# Patient Record
Sex: MALE | Race: WHITE | URBAN - METROPOLITAN AREA
[De-identification: names, ages, dates, MRNs, and addresses within clinical notes are randomized per-mention and may not be internally consistent; named-entity substitution may affect disease eponyms.]

---

## 2019-12-24 ENCOUNTER — HOSPITAL ENCOUNTER (INPATIENT)
Facility: CLINIC | Age: 44
LOS: 7 days | Discharge: HOME | DRG: 885 | End: 2019-12-31
Attending: EMERGENCY MEDICINE | Admitting: PSYCHIATRY & NEUROLOGY
Payer: COMMERCIAL

## 2019-12-24 DIAGNOSIS — F30.9 MANIA (H): Primary | ICD-10-CM

## 2019-12-24 DIAGNOSIS — F29 PSYCHOSIS, UNSPECIFIED PSYCHOSIS TYPE (H): ICD-10-CM

## 2019-12-24 DIAGNOSIS — F31.9 BIPOLAR AFFECTIVE DISORDER, REMISSION STATUS UNSPECIFIED (H): ICD-10-CM

## 2019-12-24 DIAGNOSIS — R45.1 AGITATION: ICD-10-CM

## 2019-12-24 LAB
AMPHETAMINES UR QL SCN: NEGATIVE
BARBITURATES UR QL: NEGATIVE
BENZODIAZ UR QL: NEGATIVE
CANNABINOIDS UR QL SCN: POSITIVE
COCAINE UR QL: NEGATIVE
ETHANOL SERPL-MCNC: <0.01 G/DL
OPIATES UR QL SCN: NEGATIVE
PCP UR QL SCN: NEGATIVE

## 2019-12-24 PROCEDURE — 25000132 ZZH RX MED GY IP 250 OP 250 PS 637: Performed by: PSYCHIATRY & NEUROLOGY

## 2019-12-24 PROCEDURE — 12400000 ZZH R&B MH

## 2019-12-24 PROCEDURE — 80307 DRUG TEST PRSMV CHEM ANLYZR: CPT | Performed by: EMERGENCY MEDICINE

## 2019-12-24 PROCEDURE — 96372 THER/PROPH/DIAG INJ SC/IM: CPT

## 2019-12-24 PROCEDURE — 99285 EMERGENCY DEPT VISIT HI MDM: CPT | Mod: 25

## 2019-12-24 PROCEDURE — 25000132 ZZH RX MED GY IP 250 OP 250 PS 637: Performed by: EMERGENCY MEDICINE

## 2019-12-24 PROCEDURE — 25000128 H RX IP 250 OP 636

## 2019-12-24 PROCEDURE — 80320 DRUG SCREEN QUANTALCOHOLS: CPT | Performed by: EMERGENCY MEDICINE

## 2019-12-24 PROCEDURE — 25000125 ZZHC RX 250

## 2019-12-24 PROCEDURE — 90791 PSYCH DIAGNOSTIC EVALUATION: CPT

## 2019-12-24 RX ORDER — LORAZEPAM 2 MG/ML
INJECTION INTRAMUSCULAR
Status: COMPLETED
Start: 2019-12-24 | End: 2019-12-24

## 2019-12-24 RX ORDER — DIPHENHYDRAMINE HYDROCHLORIDE 50 MG/ML
50 INJECTION INTRAMUSCULAR; INTRAVENOUS EVERY 6 HOURS PRN
Status: DISCONTINUED | OUTPATIENT
Start: 2019-12-24 | End: 2019-12-31 | Stop reason: HOSPADM

## 2019-12-24 RX ORDER — HALOPERIDOL 5 MG/1
5-10 TABLET ORAL EVERY 6 HOURS PRN
Status: DISCONTINUED | OUTPATIENT
Start: 2019-12-24 | End: 2019-12-25

## 2019-12-24 RX ORDER — HALOPERIDOL 5 MG/ML
5-10 INJECTION INTRAMUSCULAR EVERY 6 HOURS PRN
Status: DISCONTINUED | OUTPATIENT
Start: 2019-12-24 | End: 2019-12-25

## 2019-12-24 RX ORDER — OLANZAPINE 10 MG/2ML
5-10 INJECTION, POWDER, FOR SOLUTION INTRAMUSCULAR EVERY 6 HOURS PRN
Status: DISCONTINUED | OUTPATIENT
Start: 2019-12-24 | End: 2019-12-25

## 2019-12-24 RX ORDER — OLANZAPINE 10 MG/2ML
INJECTION, POWDER, FOR SOLUTION INTRAMUSCULAR
Status: COMPLETED
Start: 2019-12-24 | End: 2019-12-24

## 2019-12-24 RX ORDER — TRAZODONE HYDROCHLORIDE 50 MG/1
50-100 TABLET, FILM COATED ORAL AT BEDTIME
Status: ON HOLD | COMMUNITY
End: 2019-12-31

## 2019-12-24 RX ORDER — LORAZEPAM 1 MG/1
1-2 TABLET ORAL EVERY 6 HOURS PRN
Status: DISCONTINUED | OUTPATIENT
Start: 2019-12-24 | End: 2019-12-25

## 2019-12-24 RX ORDER — OLANZAPINE 5 MG/1
5 TABLET ORAL ONCE
Status: COMPLETED | OUTPATIENT
Start: 2019-12-24 | End: 2019-12-24

## 2019-12-24 RX ORDER — OLANZAPINE 2.5 MG/1
2.5 TABLET, FILM COATED ORAL ONCE
Status: DISCONTINUED | OUTPATIENT
Start: 2019-12-24 | End: 2019-12-24

## 2019-12-24 RX ORDER — LORAZEPAM 2 MG/ML
1-2 INJECTION INTRAMUSCULAR EVERY 6 HOURS PRN
Status: DISCONTINUED | OUTPATIENT
Start: 2019-12-24 | End: 2019-12-25

## 2019-12-24 RX ORDER — ACETAMINOPHEN 325 MG/1
650 TABLET ORAL EVERY 4 HOURS PRN
Status: DISCONTINUED | OUTPATIENT
Start: 2019-12-24 | End: 2019-12-31 | Stop reason: HOSPADM

## 2019-12-24 RX ORDER — LORAZEPAM 2 MG/ML
2 INJECTION INTRAMUSCULAR ONCE
Status: COMPLETED | OUTPATIENT
Start: 2019-12-24 | End: 2019-12-24

## 2019-12-24 RX ORDER — ARIPIPRAZOLE 2 MG/1
4 TABLET ORAL DAILY
Status: ON HOLD | COMMUNITY
End: 2019-12-31

## 2019-12-24 RX ORDER — OLANZAPINE 10 MG/2ML
10 INJECTION, POWDER, FOR SOLUTION INTRAMUSCULAR DAILY PRN
Status: DISCONTINUED | OUTPATIENT
Start: 2019-12-24 | End: 2019-12-24 | Stop reason: DRUGHIGH

## 2019-12-24 RX ORDER — OLANZAPINE 2.5 MG/1
2.5 TABLET, FILM COATED ORAL AT BEDTIME
Status: DISCONTINUED | OUTPATIENT
Start: 2019-12-24 | End: 2019-12-24

## 2019-12-24 RX ORDER — HYDROXYZINE HYDROCHLORIDE 25 MG/1
25 TABLET, FILM COATED ORAL EVERY 4 HOURS PRN
Status: DISCONTINUED | OUTPATIENT
Start: 2019-12-24 | End: 2019-12-31 | Stop reason: HOSPADM

## 2019-12-24 RX ORDER — CLONAZEPAM 0.5 MG/1
0.5 TABLET ORAL 2 TIMES DAILY PRN
Status: DISCONTINUED | OUTPATIENT
Start: 2019-12-24 | End: 2019-12-31 | Stop reason: HOSPADM

## 2019-12-24 RX ORDER — ARIPIPRAZOLE 2 MG/1
2 TABLET ORAL DAILY
Status: DISCONTINUED | OUTPATIENT
Start: 2019-12-24 | End: 2019-12-24 | Stop reason: CLARIF

## 2019-12-24 RX ORDER — WATER 10 ML/10ML
INJECTION INTRAMUSCULAR; INTRAVENOUS; SUBCUTANEOUS
Status: COMPLETED
Start: 2019-12-24 | End: 2019-12-24

## 2019-12-24 RX ORDER — DIVALPROEX SODIUM 500 MG/1
500 TABLET, EXTENDED RELEASE ORAL AT BEDTIME
Status: DISCONTINUED | OUTPATIENT
Start: 2019-12-24 | End: 2019-12-25

## 2019-12-24 RX ORDER — CLONAZEPAM 0.5 MG/1
0.5 TABLET ORAL 2 TIMES DAILY PRN
Status: ON HOLD | COMMUNITY
End: 2019-12-31

## 2019-12-24 RX ORDER — OLANZAPINE 5 MG/1
5 TABLET ORAL AT BEDTIME
Status: DISCONTINUED | OUTPATIENT
Start: 2019-12-24 | End: 2019-12-24

## 2019-12-24 RX ORDER — OLANZAPINE 5 MG/1
5-10 TABLET, ORALLY DISINTEGRATING ORAL EVERY 6 HOURS PRN
Status: DISCONTINUED | OUTPATIENT
Start: 2019-12-24 | End: 2019-12-25

## 2019-12-24 RX ORDER — ARIPIPRAZOLE 10 MG/1
10 TABLET ORAL DAILY
Status: DISCONTINUED | OUTPATIENT
Start: 2019-12-24 | End: 2019-12-31 | Stop reason: HOSPADM

## 2019-12-24 RX ADMIN — LORAZEPAM 2 MG: 2 INJECTION INTRAMUSCULAR; INTRAVENOUS at 00:50

## 2019-12-24 RX ADMIN — LORAZEPAM 2 MG: 1 TABLET ORAL at 13:45

## 2019-12-24 RX ADMIN — OLANZAPINE 10 MG: 10 INJECTION, POWDER, FOR SOLUTION INTRAMUSCULAR at 00:51

## 2019-12-24 RX ADMIN — OLANZAPINE 5 MG: 5 TABLET, FILM COATED ORAL at 12:53

## 2019-12-24 RX ADMIN — DIVALPROEX SODIUM 500 MG: 500 TABLET, EXTENDED RELEASE ORAL at 21:01

## 2019-12-24 RX ADMIN — LORAZEPAM 2 MG: 2 INJECTION INTRAMUSCULAR at 00:50

## 2019-12-24 RX ADMIN — WATER: 1 INJECTION INTRAMUSCULAR; INTRAVENOUS; SUBCUTANEOUS at 00:50

## 2019-12-24 RX ADMIN — HALOPERIDOL 10 MG: 5 TABLET ORAL at 13:45

## 2019-12-24 ASSESSMENT — ENCOUNTER SYMPTOMS: AGITATION: 1

## 2019-12-24 ASSESSMENT — ACTIVITIES OF DAILY LIVING (ADL)
DRESS: INDEPENDENT
DRESS: 0-->INDEPENDENT
HYGIENE/GROOMING: INDEPENDENT
LAUNDRY: WITH SUPERVISION
COGNITION: 0 - NO COGNITION ISSUES REPORTED
AMBULATION: 0-->INDEPENDENT
ORAL_HYGIENE: INDEPENDENT
LAUNDRY: WITH SUPERVISION
ORAL_HYGIENE: INDEPENDENT
HYGIENE/GROOMING: INDEPENDENT
RETIRED_COMMUNICATION: 0-->UNDERSTANDS/COMMUNICATES WITHOUT DIFFICULTY
DRESS: INDEPENDENT
RETIRED_EATING: 0-->INDEPENDENT
FALL_HISTORY_WITHIN_LAST_SIX_MONTHS: NO
TRANSFERRING: 0-->INDEPENDENT
SWALLOWING: 0-->SWALLOWS FOODS/LIQUIDS WITHOUT DIFFICULTY
TOILETING: 0-->INDEPENDENT
BATHING: 0-->INDEPENDENT

## 2019-12-24 ASSESSMENT — MIFFLIN-ST. JEOR: SCORE: 1770.78
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
## 2019-12-24 NOTE — PROGRESS NOTES
12/24/19 1342   Patient Belongings   Did you bring any home meds/supplements to the hospital?  No   Patient Belongings locker   Patient Belongings Put in Hospital Secure Location (Security or Locker, etc.) clothing;other (see comments)   Belongings Search Yes   Clothing Search Yes   Second Staff Caridad    Comment Pt belongings were searched and placed in lcoker    black tote  Black jacket   Hand lotion   Battery  Boxers  Sweat pant with strings      A               Admission:  I am responsible for any personal items that are not sent to the safe or pharmacy.  [facility] is not responsible for loss, theft or damage of any property in my possession.    Signature:  _________________________________ Date: _______  Time: _____                                              Staff Signature:  ____________________________ Date: ________  Time: _____      2nd Staff person, if patient is unable/unwilling to sign:    Signature: ________________________________ Date: ________  Time: _____     Discharge:  [facility] has returned all of my personal belongings:    Signature: _________________________________ Date: ________  Time: _____                                          Staff Signature:  ____________________________ Date: ________  Time: _____

## 2019-12-24 NOTE — ED NOTES
"Pt. approached the desk, told this writer to \"contact Trump and make him dump\" and made other comments that did not make sense to this writer. Pt. redirected to sit in common area. Spouse at bedside.  "

## 2019-12-24 NOTE — ED PROVIDER NOTES
Assumed care at change of shift.  Patient awaiting admission.  This was arranged here at [facility].  Initiated a 72-hour hold given the patient's psychosis and lack of insight.     Scar Bennett MD  12/24/19 5618

## 2019-12-24 NOTE — ED NOTES
"Pt. pacing around the commons area, climbing on the ledge of the window to look out the window. Pt. then attempted to open door to another patient's room. Pt. came up to desk, then asked \"can I help you?\" to this writer and asked \"can I give you the password?\". This writer notified patient that a hot lunch was ordered, and that this writer would bring it to the room when ready. Pt. appears restless.   "

## 2019-12-24 NOTE — ED NOTES
Patient's spouse, Valerie, arrived.  Pt was happy to see spouse.  Given lunch.  Pt still speaking nonsensical.

## 2019-12-24 NOTE — ED NOTES
"Patient removed pants.  Instructed to put back on, states \"I want my own pants.\"  Patient went over to lockers to attempt to open.  Stated \"I'm working some shit out.\"  Patient then went back over to pants to put back on.  Redirectable, cooperative.   "

## 2019-12-24 NOTE — ED NOTES
Given copy of 72 hour hold.  Pt was upset but not aggressive.  Patient asked offered zyprexa, declined.

## 2019-12-24 NOTE — PHARMACY-ADMISSION MEDICATION HISTORY
Pharmacy Medication History  Admission medication history interview status for the 12/24/2019  admission is complete. See EPIC admission navigator for prior to admission medications     Medication history sources: Spoke w/ patient.  Called patient's wife (Valerie [phone]) to verify medications per patient request.    Medication history source reliability: Moderate  Adherence assessment: Moderate    Significant changes made to the medication list:  - All medications added.     Medication reconciliation completed by provider prior to medication history? No    Time spent in this activity: 10 minutes      Prior to Admission medications    Medication Sig Last Dose Taking? Auth Provider   ARIPiprazole (ABILIFY) 2 MG tablet Take 4 mg by mouth daily Past Week at Unknown time Yes Unknown, Entered By History   clonazePAM (KLONOPIN) 0.5 MG tablet Take 0.5 mg by mouth 2 times daily as needed for anxiety  at prn Yes Unknown, Entered By History   traZODone (DESYREL) 50 MG tablet Take  mg by mouth At Bedtime Past Week at Unknown time Yes Unknown, Entered By History     Leigh Gallegos, PharmD, BCPS
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
## 2019-12-24 NOTE — ED NOTES
"Nurse spoke to patients wife Yary. Per patients wife patient has been dealing with medication changes for his bipolar disorder for the last few months and he has not been sleeping much. Per patients wife over the summer patient was drinking quite a bit and \"self medicating\". Patient has been doing Intensive outpatient for his alcohol issues four days a week at [facility]. For the last few days patient has been having worsening manic symptoms which were noticed at [facility] on Thursday prompting them to bring him to a crisis center near the facility for evaluation. Crisis center cleared him for discharge due to him having a scheduled appointment with his physician the following day. Patient missed his appointment on Friday and but was able to get his prescriptions filled. Patient got his prescription and took his dose however he continued to be manic. Patient was found wondering at the airport and was taken home due to having it be the holidays and wife being comfortable with his return. Patient began getting angry at family tonight wanted to leave and wife called PD to get patient help.   "

## 2019-12-24 NOTE — ED NOTES
Patient taken out of restraints. Patient resting in bed with eyes closed. Vital signs stable. Condom catheter placed in attempts to obtain urine sample. Will continue to monitor

## 2019-12-25 LAB
ANION GAP SERPL CALCULATED.3IONS-SCNC: 4 MMOL/L (ref 3–14)
BASOPHILS # BLD AUTO: 0 10E9/L (ref 0–0.2)
BASOPHILS NFR BLD AUTO: 0.3 %
BUN SERPL-MCNC: 11 MG/DL (ref 7–30)
CALCIUM SERPL-MCNC: 9.4 MG/DL (ref 8.5–10.1)
CHLORIDE SERPL-SCNC: 106 MMOL/L (ref 94–109)
CO2 SERPL-SCNC: 29 MMOL/L (ref 20–32)
CREAT SERPL-MCNC: 0.87 MG/DL (ref 0.66–1.25)
DIFFERENTIAL METHOD BLD: NORMAL
EOSINOPHIL # BLD AUTO: 0.3 10E9/L (ref 0–0.7)
EOSINOPHIL NFR BLD AUTO: 2.7 %
ERYTHROCYTE [DISTWIDTH] IN BLOOD BY AUTOMATED COUNT: 12.3 % (ref 10–15)
GFR SERPL CREATININE-BSD FRML MDRD: >90 ML/MIN/{1.73_M2}
GLUCOSE SERPL-MCNC: 100 MG/DL (ref 70–99)
HCT VFR BLD AUTO: 46.8 % (ref 40–53)
HGB BLD-MCNC: 16.3 G/DL (ref 13.3–17.7)
IMM GRANULOCYTES # BLD: 0 10E9/L (ref 0–0.4)
IMM GRANULOCYTES NFR BLD: 0.1 %
LYMPHOCYTES # BLD AUTO: 2.2 10E9/L (ref 0.8–5.3)
LYMPHOCYTES NFR BLD AUTO: 23.6 %
MCH RBC QN AUTO: 31.7 PG (ref 26.5–33)
MCHC RBC AUTO-ENTMCNC: 34.8 G/DL (ref 31.5–36.5)
MCV RBC AUTO: 91 FL (ref 78–100)
MONOCYTES # BLD AUTO: 0.5 10E9/L (ref 0–1.3)
MONOCYTES NFR BLD AUTO: 5.4 %
NEUTROPHILS # BLD AUTO: 6.4 10E9/L (ref 1.6–8.3)
NEUTROPHILS NFR BLD AUTO: 67.9 %
NRBC # BLD AUTO: 0 10*3/UL
NRBC BLD AUTO-RTO: 0 /100
PLATELET # BLD AUTO: 322 10E9/L (ref 150–450)
POTASSIUM SERPL-SCNC: 4.2 MMOL/L (ref 3.4–5.3)
RBC # BLD AUTO: 5.15 10E12/L (ref 4.4–5.9)
SODIUM SERPL-SCNC: 139 MMOL/L (ref 133–144)
TSH SERPL DL<=0.005 MIU/L-ACNC: 0.89 MU/L (ref 0.4–4)
WBC # BLD AUTO: 9.4 10E9/L (ref 4–11)

## 2019-12-25 PROCEDURE — 84443 ASSAY THYROID STIM HORMONE: CPT | Performed by: PSYCHIATRY & NEUROLOGY

## 2019-12-25 PROCEDURE — 85025 COMPLETE CBC W/AUTO DIFF WBC: CPT | Performed by: PSYCHIATRY & NEUROLOGY

## 2019-12-25 PROCEDURE — 99207 ZZC NON-BILLABLE SERV PER CHARTING: CPT | Performed by: PHYSICIAN ASSISTANT

## 2019-12-25 PROCEDURE — 25000132 ZZH RX MED GY IP 250 OP 250 PS 637: Performed by: PSYCHIATRY & NEUROLOGY

## 2019-12-25 PROCEDURE — 12400000 ZZH R&B MH

## 2019-12-25 PROCEDURE — 36415 COLL VENOUS BLD VENIPUNCTURE: CPT | Performed by: PSYCHIATRY & NEUROLOGY

## 2019-12-25 PROCEDURE — 80048 BASIC METABOLIC PNL TOTAL CA: CPT | Performed by: PSYCHIATRY & NEUROLOGY

## 2019-12-25 RX ORDER — DIVALPROEX SODIUM 500 MG/1
1000 TABLET, EXTENDED RELEASE ORAL AT BEDTIME
Status: DISCONTINUED | OUTPATIENT
Start: 2019-12-25 | End: 2019-12-26

## 2019-12-25 RX ORDER — HALOPERIDOL 5 MG/1
5-10 TABLET ORAL EVERY 4 HOURS PRN
Status: DISCONTINUED | OUTPATIENT
Start: 2019-12-25 | End: 2019-12-31 | Stop reason: HOSPADM

## 2019-12-25 RX ORDER — OLANZAPINE 10 MG/2ML
5-10 INJECTION, POWDER, FOR SOLUTION INTRAMUSCULAR EVERY 4 HOURS PRN
Status: DISCONTINUED | OUTPATIENT
Start: 2019-12-25 | End: 2019-12-31 | Stop reason: HOSPADM

## 2019-12-25 RX ORDER — DIVALPROEX SODIUM 500 MG/1
500 TABLET, EXTENDED RELEASE ORAL DAILY
Status: DISCONTINUED | OUTPATIENT
Start: 2019-12-26 | End: 2019-12-26

## 2019-12-25 RX ORDER — DIVALPROEX SODIUM 500 MG/1
500 TABLET, EXTENDED RELEASE ORAL 2 TIMES DAILY
Status: DISCONTINUED | OUTPATIENT
Start: 2019-12-25 | End: 2019-12-25

## 2019-12-25 RX ORDER — DIVALPROEX SODIUM 500 MG/1
1000 TABLET, DELAYED RELEASE ORAL AT BEDTIME
Status: DISCONTINUED | OUTPATIENT
Start: 2019-12-25 | End: 2019-12-25

## 2019-12-25 RX ORDER — OLANZAPINE 5 MG/1
5-10 TABLET, ORALLY DISINTEGRATING ORAL EVERY 4 HOURS PRN
Status: DISCONTINUED | OUTPATIENT
Start: 2019-12-25 | End: 2019-12-31 | Stop reason: HOSPADM

## 2019-12-25 RX ADMIN — NICOTINE POLACRILEX 4 MG: 2 GUM, CHEWING ORAL at 13:29

## 2019-12-25 RX ADMIN — HALOPERIDOL 10 MG: 5 TABLET ORAL at 07:57

## 2019-12-25 RX ADMIN — HALOPERIDOL 5 MG: 5 TABLET ORAL at 20:34

## 2019-12-25 RX ADMIN — HYDROXYZINE HYDROCHLORIDE 25 MG: 25 TABLET, FILM COATED ORAL at 20:07

## 2019-12-25 RX ADMIN — DIVALPROEX SODIUM 500 MG: 500 TABLET, EXTENDED RELEASE ORAL at 10:38

## 2019-12-25 RX ADMIN — NICOTINE POLACRILEX 4 MG: 2 GUM, CHEWING ORAL at 17:56

## 2019-12-25 RX ADMIN — HYDROXYZINE HYDROCHLORIDE 25 MG: 25 TABLET, FILM COATED ORAL at 13:32

## 2019-12-25 RX ADMIN — OLANZAPINE 10 MG: 5 TABLET, ORALLY DISINTEGRATING ORAL at 10:39

## 2019-12-25 RX ADMIN — ARIPIPRAZOLE 10 MG: 10 TABLET ORAL at 07:57

## 2019-12-25 RX ADMIN — OLANZAPINE 10 MG: 5 TABLET, ORALLY DISINTEGRATING ORAL at 17:00

## 2019-12-25 RX ADMIN — DIVALPROEX SODIUM 1000 MG: 500 TABLET, EXTENDED RELEASE ORAL at 20:35

## 2019-12-25 RX ADMIN — OLANZAPINE 10 MG: 5 TABLET, ORALLY DISINTEGRATING ORAL at 04:13

## 2019-12-25 RX ADMIN — NICOTINE POLACRILEX 4 MG: 2 GUM, CHEWING ORAL at 19:47

## 2019-12-25 RX ADMIN — OLANZAPINE 10 MG: 5 TABLET, ORALLY DISINTEGRATING ORAL at 13:49

## 2019-12-25 RX ADMIN — NICOTINE POLACRILEX 4 MG: 2 GUM, CHEWING ORAL at 16:33

## 2019-12-25 ASSESSMENT — ACTIVITIES OF DAILY LIVING (ADL)
DRESS: INDEPENDENT
HYGIENE/GROOMING: INDEPENDENT
HYGIENE/GROOMING: INDEPENDENT
ORAL_HYGIENE: INDEPENDENT
ORAL_HYGIENE: INDEPENDENT
DRESS: INDEPENDENT

## 2019-12-25 NOTE — PROGRESS NOTES
Pt became less manic further into the evening. His wife and sister visited him. He was mostly sedated. RN attempted to complete admission profile, but patient was too sedated and tired to participated. Nursing will continue to monitor for safety.

## 2019-12-25 NOTE — PLAN OF CARE
"Pt initially remained manic, pacing in room and lounge. Speech pressured and rambling at times with flight of ideas; disorganized. Disoriented to time and situation. Became lethargic around 1700, stating \"I just feel so tired\". Pt slept through dinner, tray saved. Thought process impaired, with poor judgement and insight. Compliant with medications. Denied VAH/SI.     "

## 2019-12-25 NOTE — PROGRESS NOTES
Pt has been restless and anxious today; presents psychotic features. Presents a tense and sometimes labile affect but has remained calm throughout the shift. Respectful to peers and staff. Took a shower around 1130; well groomed. Pt seems paranoid at some points and his speech is circumstantial and consists of rambling and/or flight of ideas; at one point pt stated that he didn't care if people saw him getting carried away by aliens. Meds have seemed to decrease these symptoms of his speech. Pt talks about his wife and if /when she can come to get him out of here and how embarrassing it is if people found out.
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
## 2019-12-25 NOTE — H&P
Pt seen for initial psychiatric evaluation, please see my dictation for details and recommendations. Dr. Garduno

## 2019-12-25 NOTE — PLAN OF CARE
BEHAVIORAL TEAM DISCUSSION    Participants: dr flores, RN, CTC (v jacklyn)  Progress: new admit.   Admitted with zulay, agitation, and psychosis.   Pt is more calm today after some doses of medication.  Anticipated length of stay: a week  Continued Stay Criteria/Rationale: new admit  Medical/Physical: no  known acute medical issues   Precautions:   Behavioral Orders   Procedures    Assault precautions    Code 1 - Restrict to Unit    Routine Programming     As clinically indicated    Status 15     Every 15 minutes.     Plan:  -Meds per Dr Flores  -Assist with outpt psychiatry appt.  -Determine whether pt will resume tx at [facility]'s outpt CD tx program.   As of today, pt is saying he does NOT want to return to the program.  -He will return to live at home.    Rationale for change in precautions or plan: no change at this time

## 2019-12-26 LAB — VALPROATE SERPL-MCNC: 54 MG/L (ref 50–100)

## 2019-12-26 PROCEDURE — 36415 COLL VENOUS BLD VENIPUNCTURE: CPT | Performed by: PSYCHIATRY & NEUROLOGY

## 2019-12-26 PROCEDURE — 25000132 ZZH RX MED GY IP 250 OP 250 PS 637: Performed by: PSYCHIATRY & NEUROLOGY

## 2019-12-26 PROCEDURE — 12400000 ZZH R&B MH

## 2019-12-26 PROCEDURE — 80164 ASSAY DIPROPYLACETIC ACD TOT: CPT | Performed by: PSYCHIATRY & NEUROLOGY

## 2019-12-26 RX ORDER — DIVALPROEX SODIUM 500 MG/1
1000 TABLET, EXTENDED RELEASE ORAL 2 TIMES DAILY
Status: DISCONTINUED | OUTPATIENT
Start: 2019-12-26 | End: 2019-12-31 | Stop reason: HOSPADM

## 2019-12-26 RX ORDER — DIVALPROEX SODIUM 500 MG/1
500 TABLET, EXTENDED RELEASE ORAL ONCE
Status: COMPLETED | OUTPATIENT
Start: 2019-12-26 | End: 2019-12-26

## 2019-12-26 RX ADMIN — HYDROXYZINE HYDROCHLORIDE 25 MG: 25 TABLET, FILM COATED ORAL at 23:04

## 2019-12-26 RX ADMIN — HALOPERIDOL 10 MG: 5 TABLET ORAL at 16:28

## 2019-12-26 RX ADMIN — OLANZAPINE 10 MG: 5 TABLET, ORALLY DISINTEGRATING ORAL at 15:42

## 2019-12-26 RX ADMIN — DIVALPROEX SODIUM 1000 MG: 500 TABLET, EXTENDED RELEASE ORAL at 23:03

## 2019-12-26 RX ADMIN — NICOTINE POLACRILEX 4 MG: 2 GUM, CHEWING ORAL at 07:06

## 2019-12-26 RX ADMIN — HYDROXYZINE HYDROCHLORIDE 25 MG: 25 TABLET, FILM COATED ORAL at 18:35

## 2019-12-26 RX ADMIN — HALOPERIDOL 10 MG: 5 TABLET ORAL at 06:39

## 2019-12-26 RX ADMIN — NICOTINE POLACRILEX 4 MG: 2 GUM, CHEWING ORAL at 18:43

## 2019-12-26 RX ADMIN — DIVALPROEX SODIUM 500 MG: 500 TABLET, EXTENDED RELEASE ORAL at 13:40

## 2019-12-26 RX ADMIN — DIVALPROEX SODIUM 500 MG: 500 TABLET, EXTENDED RELEASE ORAL at 07:52

## 2019-12-26 RX ADMIN — NICOTINE POLACRILEX 4 MG: 2 GUM, CHEWING ORAL at 10:27

## 2019-12-26 RX ADMIN — HALOPERIDOL 10 MG: 5 TABLET ORAL at 23:04

## 2019-12-26 RX ADMIN — ARIPIPRAZOLE 10 MG: 10 TABLET ORAL at 07:52

## 2019-12-26 RX ADMIN — CLONAZEPAM 0.5 MG: 0.5 TABLET ORAL at 18:36

## 2019-12-26 ASSESSMENT — ACTIVITIES OF DAILY LIVING (ADL)
ORAL_HYGIENE: INDEPENDENT
LAUNDRY: WITH SUPERVISION
DRESS: INDEPENDENT
HYGIENE/GROOMING: INDEPENDENT

## 2019-12-26 NOTE — PROGRESS NOTES
12/26/19 1300   General Information   Has Not Attended OT as of: 12/26/19     Pt has not attended OT since admit.  Will continue to encourage participation and completion of  self-assessment as able. OT staff will explain the purpose of being involved with treatment plan and provide options to meet current needs and goals.

## 2019-12-26 NOTE — PROGRESS NOTES
Shift Update: Pt mood is anxious. Thought process is impaired. Insight is poor.    Patient up at 0400 asking to use phone; redirected and remained in lounge restless. Pt requested nicotine gum to help calm himself; writer explained nicotine use times. Writer offered PRN Haldol; pt declined stating he doesn't want to get sleepy since it was already close to time to wake up. Patient was educated on anxiety and agitation symptoms and the use of PRNs to alleviate symptoms. Patient was receptive.       0630 Patient became very agitated and wanted to see the psychiatrist and call his wife. Patient was disruptive, shouted verbal obscenities, slammed the phone multiple times, and was verbally aggressive to other patients. Patient was shouting delusional statements about the clock and time, and displayed paranoid symptoms. Was instructed multiple times to go to room. PRN Haldol 10 mg and nicotine gum given.

## 2019-12-26 NOTE — PROGRESS NOTES
[facility] Psychiatric Progress Note       Interim History     The patient's care was discussed with the treatment team and chart notes were reviewed. Pt seen on 12/26/2019.  Patient received several doses of Zyprexa and 10 mg of Haldol Keppra 1500 mg of Depakote with a blood level of 54.  Staff report patient has been irritable anxious and agitated this also interacting negatively to other patients.  Patient continues to have poor insight we did agree that he has problems with zualy and he started to see a true patient also has started to have insight into his abuse of chemicals is willing to pursue competency treatment once he is completed his stabilization.  Patient was able to calm down after a long meeting with Dr. Garduno.  He had previously signed voluntarily in the hospital 72-hour hold was discontinued.  Later on staff report patient became agitated.  After argument on the phone with his wife again and wanted to sign out this placed back on a 72-hour hold by Dr. Garduno     Hospital Course        Medications     Current Facility-Administered Medications Ordered in Epic   Medication Dose Route Frequency Last Rate Last Dose     acetaminophen (TYLENOL) tablet 650 mg  650 mg Oral Q4H PRN         ARIPiprazole (ABILIFY) tablet 10 mg  10 mg Oral Daily   10 mg at 12/26/19 0752     clonazePAM (klonoPIN) tablet 0.5 mg  0.5 mg Oral BID PRN         diphenhydrAMINE (BENADRYL) injection 50 mg  50 mg Intramuscular Q6H PRN         divalproex sodium extended-release (DEPAKOTE ER) 24 hr tablet 1,000 mg  1,000 mg Oral BID         divalproex sodium extended-release (DEPAKOTE ER) 24 hr tablet 500 mg  500 mg Oral Once         haloperidol (HALDOL) tablet 5-10 mg  5-10 mg Oral Q4H PRN   10 mg at 12/26/19 0639     hydrOXYzine (ATARAX) tablet 25 mg  25 mg Oral Q4H PRN   25 mg at 12/25/19 2007     nicotine (NICORETTE) gum 2-4 mg  2-4 mg Buccal Q1H PRN   4 mg at 12/26/19 1027     OLANZapine zydis (zyPREXA) ODT  "tab 5-10 mg  5-10 mg Oral Q4H PRN   10 mg at 12/25/19 1700    Or     OLANZapine (zyPREXA) injection 5-10 mg  5-10 mg Intramuscular Q4H PRN         No current Epic-ordered outpatient medications on file.         Allergies      No Known Allergies     Medical Review of Systems     /79   Pulse 69   Temp 97.6  F (36.4  C) (Oral)   Resp 18   Ht 1.854 m (6' 1\")   Wt 83.2 kg (183 lb 6.4 oz)   SpO2 97%   BMI 24.20 kg/m    Body mass index is 24.2 kg/m .  A 10-point review of systems was performed by Segundo Garduno MD and is negative, no new findings.      Psychiatric Examination     Appearance Sitting in chair, dressed in hospital scrubs. Appears stated age.   Attitude Cooperative   Orientation Oriented to person, place, time   Eye Contact Poor   Speech Regular rate, rhythm, volume and tone   Language Normal   Psychomotor Behavior Normal   Mood  labile reaction.   Affect  aggravated irritable   Thought Process Goal-Oriented, Intact   Associations  much less but still present   Thought Content  positive for grandiosity paranoia racing thoughts in   Fund of Knowledge  intact   Insight  Insight impaired but some improvement   Judgement  judgment still extremely impaired   Attention Span & Concentration  poor   Recent & Remote Memory  intact   Gait Normal   Muscle Tone Intact        Labs     Labs reviewed.  Recent Results (from the past 24 hour(s))   Valproic acid    Collection Time: 12/26/19  8:00 AM   Result Value Ref Range    Valproic Acid Level 54 50 - 100 mg/L        Impression     This is a 44 year old male with history of bipolar affective disorder currently manic with psychotic features extremely labile he is received multiple milligrams of medicines we will increase his Depakote to 1000 twice a day Depakote XR gave verbal consent we will recheck a blood level tomorrow again after having a 54 this morning on 1500 mg we will continue Zyprexa and also as needed's of Haldol initial attempts patient signed " voluntarily and is now refusing was placed back in a 72-hour hold patient also had some better insight into need for greater level of compressive treatment care including mental illness        Diagnoses     Bipolar Disorder, most recent episode manic with psychotic features   Alcohol use disorder  Cannabis use disorder     Plan     1. Explained side effects, benefits, and complications of medications to the patient, Pt gave verbal consent.  Alcohol use disorder  2. Medication changes: Increase Depakote to 2000 and ER extended release twice a day, continue Zyprexa scheduled and as needed was also on Haldol  3. Discussed treatment plan with patient and team.  4. Projected length of stay: 10 days  5. Once patient is stable we will redo competency assessment  6. Continue to contact patient's spouse      Attestation:   Patient has been seen and evaluated by me, Segundo Garduno MD.    Patient ID:  Name: Nando Garcia    MRN: [id number]  Admission: 12/24/2019   YOB: 1975
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
## 2019-12-26 NOTE — PLAN OF CARE
Shift Update: Pt mood is tense labile Thought process is impaired. Insight is poor. Pt denies suicidal ideation. Pt agitated this AM demanding to see MD and and wanted to go home.Pt was reassured that the psychiatrist would see him. His mood switches from being angry , agitated to polite. States he wants to go home but he still exhibits manic behaviors and is intrusive and irritable toward others. Said to one peer that he should read about Cesar henderson and pointed the finger at him. Psychiatry in to see pt and increased his Depakote to 1000 mg bid. Depakote level today 53. His wife called and would like a call back from the Psychiatrist after he sees him. This message was relayed and tel # given. Extra dose of Depakote 500 mg to be given ASAP.

## 2019-12-26 NOTE — PLAN OF CARE
Problem: Adult Inpatient Plan of Care  Goal: Plan of Care Review  12/25/2019 2219 by Joaquin Flower RN  Outcome: Declining  Pt mood is is labile. Presents as angry, tense, and irritable. He was fixated on going home with his wife and sister. Became agitated and required 5 mg of PRN Haldol. Recent med changes also include an increase of his HS Depakote to 1000 mg. Thought process is disorganized. Continues to be hyperactive and restless. Insight is poor. Pt denies any suicidal ideation. Nursing will continue to for safety.

## 2019-12-27 PROCEDURE — 36415 COLL VENOUS BLD VENIPUNCTURE: CPT | Performed by: PSYCHIATRY & NEUROLOGY

## 2019-12-27 PROCEDURE — 80165 DIPROPYLACETIC ACID FREE: CPT | Performed by: PSYCHIATRY & NEUROLOGY

## 2019-12-27 PROCEDURE — 12400000 ZZH R&B MH

## 2019-12-27 PROCEDURE — 25000132 ZZH RX MED GY IP 250 OP 250 PS 637: Performed by: PSYCHIATRY & NEUROLOGY

## 2019-12-27 RX ORDER — HALOPERIDOL 5 MG/1
10 TABLET ORAL 3 TIMES DAILY
Status: DISCONTINUED | OUTPATIENT
Start: 2019-12-27 | End: 2019-12-31 | Stop reason: HOSPADM

## 2019-12-27 RX ADMIN — OLANZAPINE 10 MG: 5 TABLET, ORALLY DISINTEGRATING ORAL at 01:19

## 2019-12-27 RX ADMIN — HALOPERIDOL 10 MG: 5 TABLET ORAL at 20:45

## 2019-12-27 RX ADMIN — NICOTINE POLACRILEX 2 MG: 2 GUM, CHEWING ORAL at 18:15

## 2019-12-27 RX ADMIN — ARIPIPRAZOLE 10 MG: 10 TABLET ORAL at 08:00

## 2019-12-27 RX ADMIN — OLANZAPINE 10 MG: 5 TABLET, ORALLY DISINTEGRATING ORAL at 11:51

## 2019-12-27 RX ADMIN — NICOTINE POLACRILEX 2 MG: 2 GUM, CHEWING ORAL at 13:17

## 2019-12-27 RX ADMIN — NICOTINE POLACRILEX 2 MG: 2 GUM, CHEWING ORAL at 12:03

## 2019-12-27 RX ADMIN — NICOTINE POLACRILEX 2 MG: 2 GUM, CHEWING ORAL at 19:35

## 2019-12-27 RX ADMIN — HALOPERIDOL 10 MG: 5 TABLET ORAL at 09:23

## 2019-12-27 RX ADMIN — CLONAZEPAM 0.5 MG: 0.5 TABLET ORAL at 00:19

## 2019-12-27 RX ADMIN — DIVALPROEX SODIUM 1000 MG: 500 TABLET, EXTENDED RELEASE ORAL at 08:00

## 2019-12-27 RX ADMIN — HALOPERIDOL 10 MG: 5 TABLET ORAL at 16:27

## 2019-12-27 RX ADMIN — NICOTINE POLACRILEX 2 MG: 2 GUM, CHEWING ORAL at 09:23

## 2019-12-27 RX ADMIN — DIVALPROEX SODIUM 1000 MG: 500 TABLET, EXTENDED RELEASE ORAL at 20:45

## 2019-12-27 ASSESSMENT — ACTIVITIES OF DAILY LIVING (ADL)
LAUNDRY: WITH SUPERVISION
LAUNDRY: WITH SUPERVISION
DRESS: INDEPENDENT
ORAL_HYGIENE: INDEPENDENT
HYGIENE/GROOMING: INDEPENDENT
HYGIENE/GROOMING: INDEPENDENT
DRESS: INDEPENDENT
ORAL_HYGIENE: INDEPENDENT

## 2019-12-27 NOTE — PROGRESS NOTES
Shift Update: Pt mood is tense and labile. Thought process is impaired. Insight is poor.     Patient was agitated and irritable upon shift change demanding to speak to the provider and leave. Patient was intrusive and demanding of staff; redirectable for brief periods of time. Patient was given PRN Haldol and Atarax for agitation and anxiety; not effective after one hour. Patient given clonazepam after one hour; not effective. Patient was then given PRN Zyprexa after another hour; effective. Patient remained in room and fell asleep.

## 2019-12-27 NOTE — PLAN OF CARE
Pt is was clam until this morning, but started asking when his wife was going to make the discharge happen this afternoon afternoon. Pt has been pacing the ITC unit. Insight is poor with blunt affect and impaired judgement. Wife called to find out how patient was doing, didn't feel like coming to the floor as pt gets more irritated when she comes. Pt is med compliant, pleasantly manic. Pt was started on Haldol 10 TID and still maintains the haldol prn.

## 2019-12-27 NOTE — PLAN OF CARE
Pt mood is tense and labile Thought process is impaired. Insight is poor. Pt denies suicidal ideation. Pt agitated and angry, demanding to be released. Pt called his wife many times.  Pt was angry and aggressive towards wife when she visited.  Pt was rude and confrontational with staff and peers.  Writer called for extra staff 2x during shift when Pt became angry and was not re-directable. Pt expects to go home on 12/27/19 but he still exhibits manic behavior and is intrusive and angry towards others.  Writer does not believe Pt wife will be safe if Pt returns home at this point. Pt wife left in tears after Pt became aggressive towards her.

## 2019-12-27 NOTE — PROGRESS NOTES
"[facility] Psychiatric Progress Note       Interim History     The patient's care was discussed with the treatment team and chart notes were reviewed. Pt seen on 12/27/2019.Pt much calmer and much less agitated.  Discussed with patient his need to go back to comprehensive treatment talked about the need to go to occur [facility] treatment center he was in full agreement with this recommendation.  Later the day he denied that he had a problem alcohol and refused to do the assessment and that he was fine and he was is going home.  Staff reported this Dr. Garduno in the evening and patient was told that he had to stay and discuss it with Dr. Garduno.        Hospital Course        Medications     Current Facility-Administered Medications Ordered in Epic   Medication Dose Route Frequency Last Rate Last Dose     acetaminophen (TYLENOL) tablet 650 mg  650 mg Oral Q4H PRN         ARIPiprazole (ABILIFY) tablet 10 mg  10 mg Oral Daily   10 mg at 12/27/19 0800     clonazePAM (klonoPIN) tablet 0.5 mg  0.5 mg Oral BID PRN   0.5 mg at 12/27/19 0019     diphenhydrAMINE (BENADRYL) injection 50 mg  50 mg Intramuscular Q6H PRN         divalproex sodium extended-release (DEPAKOTE ER) 24 hr tablet 1,000 mg  1,000 mg Oral BID   1,000 mg at 12/27/19 0800     haloperidol (HALDOL) tablet 5-10 mg  5-10 mg Oral Q4H PRN   10 mg at 12/26/19 2304     hydrOXYzine (ATARAX) tablet 25 mg  25 mg Oral Q4H PRN   25 mg at 12/26/19 2304     nicotine (NICORETTE) gum 2-4 mg  2-4 mg Buccal Q1H PRN   4 mg at 12/26/19 1843     OLANZapine zydis (zyPREXA) ODT tab 5-10 mg  5-10 mg Oral Q4H PRN   10 mg at 12/27/19 0119    Or     OLANZapine (zyPREXA) injection 5-10 mg  5-10 mg Intramuscular Q4H PRN         No current [facility]-ordered outpatient medications on file.         Allergies      No Known Allergies     Medical Review of Systems     /74   Pulse 69   Temp 97.8  F (36.6  C)   Resp 16   Ht 1.854 m (6' 1\")   Wt 83.2 kg (183 lb 6.4 " oz)   SpO2 100%   BMI 24.20 kg/m    Body mass index is 24.2 kg/m .  A 10-point review of systems was performed by Segundo Garduno MD and is negative, no new findings.      Psychiatric Examination     Appearance Sitting in chair, dressed in hospital scrubs. Appears stated age.   Attitude Cooperative   Orientation Oriented to person, place, time   Eye Contact Poor   Speech Regular rate, rhythm, volume and tone   Language Normal   Psychomotor Behavior Normal   Mood  labile reaction.   Affect  aggravated irritable   Thought Process Goal-Oriented, Intact   Associations  much less but still present   Thought Content  positive for grandiosity paranoia racing thoughts in   Fund of Knowledge  intact   Insight  Insight impaired regarding need for CD faby but some improvement   Judgement  judgment still extremely impaired   Attention Span & Concentration  poor   Recent & Remote Memory  intact   Gait Normal   Muscle Tone Intact        Labs     Labs reviewed.  No results found for this or any previous visit (from the past 24 hour(s)).     Impression     This is a 44 year old male with history of bipolar affective disorder currently manic with psychotic features extremely labile he is received multiple milligrams of medicines we will increase his Depakote to 1000 twice a day Depakote XR gave verbal consent we will recheck a blood level tomorrow again after having a 54 this morning on 1500 mg we will continue Zyprexa and also as needed's of Haldol initial attempts patient signed voluntarily and is now refusing was placed back in a 72-hour hold patient also had some better insight into need for greater level of compressive treatment care including mental illness        Diagnoses     Bipolar Disorder, most recent episode manic with psychotic features   Alcohol use disorder  Cannabis use disorder     Plan     1. Explained side effects, benefits, and complications of medications to the patient, Pt gave verbal consent.  Alcohol  use disorder  2. Medication changes: Increase Depakote to 2000 and ER extended release twice a day, continue Zyprexa scheduled and as needed was also on Haldol  3. Discussed treatment plan with patient and team.  4. Projected length of stay: 10 days  5. Once patient is stable we will redo competency assessment  6. Continue to contact patient's spouse      Attestation:   Patient has been seen and evaluated by me, Segundo Garduno MD.    Patient ID:  Name: Nando Garcia    MRN: [id number]  Admission: 12/24/2019   YOB: 1975

## 2019-12-28 LAB — VALPROATE FREE SERPL-MCNC: 8.1 UG/ML (ref 6–20)

## 2019-12-28 PROCEDURE — 12400000 ZZH R&B MH

## 2019-12-28 PROCEDURE — 25000132 ZZH RX MED GY IP 250 OP 250 PS 637: Performed by: PSYCHIATRY & NEUROLOGY

## 2019-12-28 RX ADMIN — HALOPERIDOL 10 MG: 5 TABLET ORAL at 16:13

## 2019-12-28 RX ADMIN — ARIPIPRAZOLE 10 MG: 10 TABLET ORAL at 07:03

## 2019-12-28 RX ADMIN — DIVALPROEX SODIUM 1000 MG: 500 TABLET, EXTENDED RELEASE ORAL at 20:24

## 2019-12-28 RX ADMIN — NICOTINE POLACRILEX 2 MG: 2 GUM, CHEWING ORAL at 08:17

## 2019-12-28 RX ADMIN — HALOPERIDOL 10 MG: 5 TABLET ORAL at 07:03

## 2019-12-28 RX ADMIN — DIVALPROEX SODIUM 1000 MG: 500 TABLET, EXTENDED RELEASE ORAL at 07:03

## 2019-12-28 RX ADMIN — NICOTINE POLACRILEX 4 MG: 2 GUM, CHEWING ORAL at 19:05

## 2019-12-28 RX ADMIN — HALOPERIDOL 10 MG: 5 TABLET ORAL at 20:24

## 2019-12-28 ASSESSMENT — ACTIVITIES OF DAILY LIVING (ADL)
HYGIENE/GROOMING: INDEPENDENT
DRESS: STREET CLOTHES;INDEPENDENT
ORAL_HYGIENE: INDEPENDENT
LAUNDRY: WITH SUPERVISION
HYGIENE/GROOMING: INDEPENDENT
ORAL_HYGIENE: INDEPENDENT
LAUNDRY: WITH SUPERVISION
DRESS: STREET CLOTHES;SCRUBS (BEHAVIORAL HEALTH)

## 2019-12-28 NOTE — PLAN OF CARE
Pt is calm and visiting with the wife. Still occasionally demands to leave but not really forcefully about it. Insight is fair although judgement remains impaired. Pt remains med compliant. Pt has some home med in his medication cabinet. Pt denies SI.
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
## 2019-12-28 NOTE — PROGRESS NOTES
Wife visited w/pt, spent most of the time in the room. Pt was calm and polite during visitation. Wife left around 2045. Pt spent some time in lounge watching TV. Med compliant. Pacing in lounge occasionally. Pt asked again about Dr Shaan avalos discharge tonight. Informed pt that is not happening, and will discuss w/psych tomorrow.

## 2019-12-28 NOTE — PROGRESS NOTES
"Pt visible in the lounge the majority of the shift, going back and forth to his room often; very restless. Med compliant, but initially hesitant to take meds as he did not want to get drowsy. Pt remains fixated on going home and getting a hold of his wife to help facilitate discharge. Pt was explained by numerous staff and Dr. Abbott that he will not be going anywhere today, still remains voluntary. Pt lacks all insight into his current situation and feels he does not need to be here stating, \"I don't belong here, I will see someone OP if that's what it takes\". Pt appears tense and anxious, speech pressured at times. Up to nurses station for numerous requests but was redirectable and compliant w/ POC. Showered.  "

## 2019-12-29 PROCEDURE — 25000132 ZZH RX MED GY IP 250 OP 250 PS 637: Performed by: PSYCHIATRY & NEUROLOGY

## 2019-12-29 PROCEDURE — 12400000 ZZH R&B MH

## 2019-12-29 RX ADMIN — NICOTINE POLACRILEX 4 MG: 2 GUM, CHEWING ORAL at 15:36

## 2019-12-29 RX ADMIN — DIVALPROEX SODIUM 1000 MG: 500 TABLET, EXTENDED RELEASE ORAL at 08:38

## 2019-12-29 RX ADMIN — HALOPERIDOL 10 MG: 5 TABLET ORAL at 15:36

## 2019-12-29 RX ADMIN — HALOPERIDOL 10 MG: 5 TABLET ORAL at 08:39

## 2019-12-29 RX ADMIN — DIVALPROEX SODIUM 1000 MG: 500 TABLET, EXTENDED RELEASE ORAL at 20:06

## 2019-12-29 RX ADMIN — HALOPERIDOL 10 MG: 5 TABLET ORAL at 20:06

## 2019-12-29 RX ADMIN — ARIPIPRAZOLE 10 MG: 10 TABLET ORAL at 08:38

## 2019-12-29 ASSESSMENT — ACTIVITIES OF DAILY LIVING (ADL)
ORAL_HYGIENE: INDEPENDENT
ORAL_HYGIENE: INDEPENDENT
DRESS: STREET CLOTHES;SCRUBS (BEHAVIORAL HEALTH)
HYGIENE/GROOMING: INDEPENDENT
HYGIENE/GROOMING: INDEPENDENT
DRESS: STREET CLOTHES;SCRUBS (BEHAVIORAL HEALTH);INDEPENDENT
LAUNDRY: WITH SUPERVISION

## 2019-12-29 NOTE — PLAN OF CARE
Problem: Manic Symptoms  Goal: Manic Symptoms  Description  Signs and symptoms of listed problems will be absent or manageable.  Outcome: Improving  Flowsheets (Taken 12/29/2019 1412)  Manic Symptoms Assessed: all  Manic Symptoms Present: anxiety; sleep  Note:   Patient's vitals were WNL. Patient was medication compliant and took no PRNs. Patient had no medication changes. Patient reports that he feels tired and sleepy through out the shift even though he slept well last night. Patient pleasant and cooperative. Patient requests were reasonable and infrequent. Patient states that he still feels anxious. Patient still wants to discharge but has not demanded to and states he can wait until Monday unit he meets with his attending provider. Patient was alert and oriented. Patient does present with a flat affect but brightens upon approach.

## 2019-12-29 NOTE — PLAN OF CARE
Patient spent a lot of the shift in the lounge. Social with staff and peers. His wife visited for about a half hour and then patient asked her to leave. States to staff that he is feeling stressed and sad because he is going to divorce his wife. States he is feeling stressed by all of life stressors in general. Encouraged patient to talk to staff and journal. Patient speech is pressured. Mood is anxious. Affect is full range.

## 2019-12-30 PROCEDURE — 25000132 ZZH RX MED GY IP 250 OP 250 PS 637: Performed by: PSYCHIATRY & NEUROLOGY

## 2019-12-30 PROCEDURE — 12400000 ZZH R&B MH

## 2019-12-30 RX ADMIN — ARIPIPRAZOLE 10 MG: 10 TABLET ORAL at 08:19

## 2019-12-30 RX ADMIN — DIVALPROEX SODIUM 1000 MG: 500 TABLET, EXTENDED RELEASE ORAL at 08:18

## 2019-12-30 RX ADMIN — DIVALPROEX SODIUM 1000 MG: 500 TABLET, EXTENDED RELEASE ORAL at 21:12

## 2019-12-30 RX ADMIN — HALOPERIDOL 10 MG: 5 TABLET ORAL at 08:19

## 2019-12-30 RX ADMIN — HALOPERIDOL 10 MG: 5 TABLET ORAL at 21:12

## 2019-12-30 RX ADMIN — HALOPERIDOL 10 MG: 5 TABLET ORAL at 15:13

## 2019-12-30 ASSESSMENT — ACTIVITIES OF DAILY LIVING (ADL)
DRESS: STREET CLOTHES
HYGIENE/GROOMING: INDEPENDENT
ORAL_HYGIENE: INDEPENDENT
LAUNDRY: WITH SUPERVISION

## 2019-12-30 NOTE — PLAN OF CARE
Patient spent much of the shift in the lounge. Social with peers except for one with whom he occasionally has verbal altercations with. He is redirectable. Patient continues to c/o feeling tired during the day. His mood was liable and irritated. Frequent short verbal outbursts. Apologizes immediately after. Affect tense. Speech pressured. Restless.  Med compliant. VSS.  Hoping to discharge on Monday.

## 2019-12-30 NOTE — PLAN OF CARE
BEHAVIORAL TEAM DISCUSSION    Participants: MD, RN, CM, PA, OT   Progress: No change  Anticipated length of stay: 10 days  Continued Stay Criteria/Rationale: Continued medication adjustment and psychiatric stabilization  Medical/Physical: Per hospitalist consult  Precautions:   Behavioral Orders   Procedures    Assault precautions    Code 1 - Restrict to Unit    Routine Programming     As clinically indicated    Status 15     Every 15 minutes.     Plan:     Explained side effects, benefits, and complications of medications to the patient, Pt gave verbal consent.  Alcohol use disorder  Medication changes: Increase Depakote to 2000 and ER extended release twice a day, continue Zyprexa scheduled and as needed was also on Haldol  Discussed treatment plan with patient and team.  Projected length of stay: 10 days  Once patient is stable we will redo competency assessment  Continue to contact patient's spouse     Rationale for change in precautions or plan: Continued stabilization.

## 2019-12-30 NOTE — PLAN OF CARE
Pt has been anxious, pacing the unit and frequently on the phone with Wife. Pt has flat affect that can brighten upon approach. Insight and thought process remains poor with impaired judgement. Pt denies SI. Spent some time bed resting and in and out the room. Pt was really frustrated and continue to think that he is discharging today. Pt has been order CD consult. Pt is encouraged to do the exercises as per his Psychiatrist. Pt does not think he is addicted to alcohol but wants to work with addiction Therapist. Pt is redirectable, med compliant.

## 2019-12-31 VITALS
TEMPERATURE: 97.6 F | SYSTOLIC BLOOD PRESSURE: 113 MMHG | HEART RATE: 59 BPM | HEIGHT: 73 IN | BODY MASS INDEX: 25.08 KG/M2 | OXYGEN SATURATION: 97 % | DIASTOLIC BLOOD PRESSURE: 73 MMHG | WEIGHT: 189.2 LBS | RESPIRATION RATE: 16 BRPM

## 2019-12-31 LAB — VALPROATE SERPL-MCNC: 112 MG/L (ref 50–100)

## 2019-12-31 PROCEDURE — 25000132 ZZH RX MED GY IP 250 OP 250 PS 637: Performed by: PSYCHIATRY & NEUROLOGY

## 2019-12-31 PROCEDURE — 80164 ASSAY DIPROPYLACETIC ACD TOT: CPT | Performed by: PSYCHIATRY & NEUROLOGY

## 2019-12-31 PROCEDURE — 36415 COLL VENOUS BLD VENIPUNCTURE: CPT | Performed by: PSYCHIATRY & NEUROLOGY

## 2019-12-31 RX ORDER — ARIPIPRAZOLE 10 MG/1
10 TABLET ORAL DAILY
Qty: 30 TABLET | Refills: 0 | Status: SHIPPED | OUTPATIENT
Start: 2020-01-01 | End: 2020-01-31

## 2019-12-31 RX ORDER — DIVALPROEX SODIUM 500 MG/1
1000 TABLET, EXTENDED RELEASE ORAL 2 TIMES DAILY
Qty: 120 TABLET | Refills: 0 | Status: SHIPPED | OUTPATIENT
Start: 2019-12-31 | End: 2020-01-30

## 2019-12-31 RX ORDER — HALOPERIDOL 10 MG/1
10 TABLET ORAL 3 TIMES DAILY
Qty: 90 TABLET | Refills: 0 | Status: SHIPPED | OUTPATIENT
Start: 2019-12-31 | End: 2020-01-30

## 2019-12-31 RX ADMIN — HALOPERIDOL 10 MG: 5 TABLET ORAL at 16:19

## 2019-12-31 RX ADMIN — HALOPERIDOL 10 MG: 5 TABLET ORAL at 07:55

## 2019-12-31 RX ADMIN — DIVALPROEX SODIUM 1000 MG: 500 TABLET, EXTENDED RELEASE ORAL at 07:56

## 2019-12-31 RX ADMIN — ARIPIPRAZOLE 10 MG: 10 TABLET ORAL at 07:56

## 2019-12-31 ASSESSMENT — ACTIVITIES OF DAILY LIVING (ADL)
HYGIENE/GROOMING: INDEPENDENT
ORAL_HYGIENE: INDEPENDENT
DRESS: INDEPENDENT
LAUNDRY: WITH SUPERVISION

## 2019-12-31 ASSESSMENT — MIFFLIN-ST. JEOR: SCORE: 1797.09

## 2019-12-31 NOTE — PLAN OF CARE
Pt appeared pleasant but with anxious affect related to his discharge. Pt is being discharge home with his Wife. Pt denies and SI but admits slightly depressed. Insight  And thought process improved with fair judgement. Pt will be having a CD eval outpatient. Pt discharge home with Abilify and depakote from [facility], and to  Haldol 10 mg po from [facility] in [address] [facility]. Pt left the building via personal transport with Wife. Went through discharge instructions with patient and Wife present and verbalized understanding.

## 2019-12-31 NOTE — DISCHARGE INSTRUCTIONS
"Behavioral Discharge Planning and Instructions      Summary:  You were admitted to [address] for increasing agitation and manic symptoms.   Medication adjustments were made and your symptoms improved.   We also recommend you have a chemical dependency evaluation at [facility] for outpt CD treatment.   You agreed with this (see details below)   While in the hospital, Dr Garduno did have a phone conversation with your wife, Yary.        Main Diagnosis: Bipolar affective disorder, currently manic with grandiosity, but not psychosis; alcohol use disorder.      Mental Health Follow-Up:   There is a chemical dependency evaluation that has been arranged for you, as follows:  Friday Theodore 3, 2020 at 10am.    This is close to a 2-hour appointment.  It is with \"Jamila\"  It's at [address] in [place].  Phone:  [phone].      FAX:  [phone]    The goal is to get you into [facility]'s outpatient chemical dependency treatment program.  When you start that program, Dr. Garduno will see you.    After you're finished with the outpatient program, you can go back to seeing Dr Guadalupe.   Dr Guadalupe    Ph:  [phone]     Fx:  [phone]  [address], [facility]  [id number]      Attend all scheduled appointments with your outpatient providers. Call at least 24 hours in advance if you need to reschedule an appointment to ensure continued access to your outpatient providers.   Major Treatments, Procedures and Findings:  You were provided with: a psychiatric assessment, assessed for medical stability, medication evaluation and/or management and group therapy    Symptoms to Report: feeling more aggressive, increased confusion, losing more sleep, mood getting worse or thoughts of suicide    Early warning signs can include: increased depression or anxiety sleep disturbances increased thoughts or behaviors of suicide or self-harm  increased unusual thinking, such as paranoia or hearing " voices    Safety and Wellness:  Take all medicines as directed.  Make no changes unless your doctor suggests them.      Follow treatment recommendations.  Refrain from alcohol and non-prescribed drugs.  If there is a concern for safety, call 911.    Resources:  COPE (Community Outreach for Psychiatric Emergencies): 820.545.3665.  Available 24-hours a day, 7 days a week.  COPE professionals are available to:go where you are, Telephone consultations are also available.   Suicide Lifeline:  9-338-925-ULHW    Crisis Intervention: 795.690.4158 or 435-760-7497 (TTY: 845.486.4211).  Call anytime for help.  National [employer] on Mental Illness (www.[place].valerie.org): 641.951.2904 or 298-657-6338.  Alcoholics Anonymous (www.alcoholics-anonymous.org): Check your phone book for your local chapter.  Mental Health Consumer/Survivor Network of [place] (www.mhcsn.net): 673.338.1036 or 722-563-6149  Mental Health Association of [place] (www.mentalhealth.org): 131.727.2066 or 735-218-7168    The treatment team has appreciated the opportunity to work with you. If you have any questions or concerns our unit number is [phone]

## 2019-12-31 NOTE — DISCHARGE SUMMARY
[facility] Psychiatric Discharge Summary      DATE OF ADMISSION: 12/24/2019     DATE OF DISCHARGE: 12/31/2019    PRIMARY CARE PHYSICIAN: No primary care provider on file.    IDENTIFICATION: For history, see dictation by Dr. Garduno on 12/26/2019. For physical summary, see dictation by Abimbola Aguero PA-C on 12/24/2019.     HOSPITAL COURSE:     Patient received several doses of Zyprexa and 10 mg of Haldol Keppra 1500 mg of Depakote with a blood level of 54.  Staff report patient has been irritable anxious and agitated this also interacting negatively to other patients.  Patient continues to have poor insight we did agree that he has problems with zulay and he started to see a true patient also has started to have insight into his abuse of chemicals is willing to pursue competency treatment once he is completed his stabilization.  Patient was able to calm down after a long meeting with Dr. Garduno.  He had previously signed voluntarily in the hospital 72-hour hold was discontinued.  Later on staff report patient became agitated.  After argument on the phone with his wife again and wanted to sign out this placed back on a 72-hour hold by Dr. Garduno. This is a 44 year old male with history of bipolar affective disorder currently manic with psychotic features extremely labile he is received multiple milligrams of medicines we will increase his Depakote to 1000 twice a day Depakote XR gave verbal consent we will recheck a blood level tomorrow again after having a 54 this morning on 1500 mg we will continue Zyprexa and also as needed's of Haldol initial attempts patient signed voluntarily and is now refusing was placed back in a 72-hour hold patient also had some better insight into need for greater level of compressive treatment care including mental illness   This is a 44 year old male with history of bipolar affective disorder currently manic with psychotic features extremely labile he is  received multiple milligrams of medicines we will increase his Depakote to 1000 twice a day Depakote XR gave verbal consent we will recheck a blood level tomorrow again after having a 54 this morning on 1500 mg we will continue Zyprexa and also as needed's of Haldol initial attempts patient signed voluntarily and is now refusing was placed back in a 72-hour hold patient also had some better insight into need for greater level of compressive treatment care including mental illness. His Depakote was increased to 1000 twice a day Depakote XR gave verbal consent we will recheck a blood level tomorrow again after having a 54 this morning on 1500 mg we will continue Zyprexa and also as needed's of Haldol initial attempts patient signed voluntarily. also had some better insight into need for greater level of compressive treatment care including mental illness. Patient was much improved and was in agreement to attend outpatient treatment. Contacted [facility] and set up a Rule 25 Intake. Spoke with patient's wife who is in agreement with his treatment plan. He is now ready for discharge.       DISCHARGE MENTAL STATUS EXAMINATION:    Appearance Sitting in chair, dressed in hospital scrubs. Appears stated age.   Attitude Cooperative   Orientation Oriented to person, place, time   Eye Contact Poor   Speech Regular rate, rhythm, volume and tone   Language Normal   Psychomotor Behavior Normal   Mood  labile reaction.   Affect  aggravated irritable   Thought Process Goal-Oriented, Intact   Associations  much less but still present   Thought Content  positive for grandiosity paranoia racing thoughts in   Fund of Knowledge  intact   Insight  Insight impaired but some improvement   Judgement  judgment a little less impaired   Attention Span & Concentration  poor   Recent & Remote Memory  intact   Gait Normal   Muscle Tone Intact       LABORATORY DATA:    Refer to hospitalist admission dictation.  Recent  "Results (from the past 24 hour(s))   Valproic acid    Collection Time: 12/31/19  8:33 AM   Result Value Ref Range    Valproic Acid Level 112 (H) 50 - 100 mg/L        /73   Pulse 59   Temp 97.6  F (36.4  C) (Oral)   Resp 16   Ht 1.854 m (6' 1\")   Wt 85.8 kg (189 lb 3.2 oz)   SpO2 97%   BMI 24.96 kg/m       DISCHARGE MEDICATIONS:      Review of your medicines      START taking      Dose / Directions   divalproex sodium extended-release 500 MG 24 hr tablet  Commonly known as:  DEPAKOTE ER      Dose:  1,000 mg  Take 2 tablets (1,000 mg) by mouth 2 times daily  Quantity:  120 tablet  Refills:  0     haloperidol 10 MG tablet  Commonly known as:  HALDOL      Dose:  10 mg  Take 1 tablet (10 mg) by mouth 3 times daily  Quantity:  90 tablet  Refills:  0        CONTINUE these medicines which may have CHANGED, or have new prescriptions. If we are uncertain of the size of tablets/capsules you have at home, strength may be listed as something that might have changed.      Dose / Directions   ARIPiprazole 10 MG tablet  Commonly known as:  ABILIFY  This may have changed:      medication strength    how much to take      Dose:  10 mg  Start taking on:  January 1, 2020  Take 1 tablet (10 mg) by mouth daily  Quantity:  30 tablet  Refills:  0        STOP taking    clonazePAM 0.5 MG tablet  Commonly known as:  klonoPIN        traZODone 50 MG tablet  Commonly known as:  DESYREL              Where to get your medicines      These medications were sent to [facility] - [place], [place] - [address] [address]  [address] [address] [address]    Phone:  [phone]     ARIPiprazole 10 MG tablet    divalproex sodium extended-release 500 MG 24 hr tablet    haloperidol 10 MG tablet         DISCHARGE DIAGNOSES:    1. Bipolar Disorder, most recent episode manic with psychotic features.   2. Alcohol Use Disorder.  3. Cannabis Use Disorder.     DISCHARGE PLAN:     1. Discharged to home. Patient to remain " medicine compliant and remain abstinent from mood-altering substances.     DISCHARGE FOLLOW-UP:    1. Patient to go to his Rule 25 Assessment and start treatment in [facility].   2. Follow-up with Dr. Garduno at [facility] in 1 week.     Attestation:   Patient has been seen and evaluated by me, Segundo Garduno MD.    Patient ID:    Name: Nando Garcia MRN: [id number]  Admission: 12/24/2019  YOB: 1975

## 2019-12-31 NOTE — PLAN OF CARE
Pt mood is anxious and depressed. Thought process is improving. Insight is poor. Pt expressed frustration and disappointment for not being able to discharge today. He is med compliant and denied any suicidal ideation. Nursing will continue to monitor.

## 2019-12-31 NOTE — PLAN OF CARE
Anxious, depressed, he wants to go home frustrated,  Mood is calm, no behavior problems when he requested to go home.   Had a CD evaluation..  Possible discharge later today.

## 2019-12-31 NOTE — PROGRESS NOTES
"[facility] Psychiatric Progress Note       Interim History     The patient's care was discussed with the treatment team and chart notes were reviewed. Pt seen on 12/30/2019.Pt much calmer and much less. Pt had refused CD assessment so not completed,\"confronted patient about this, informed him that he was not ready to go home and that part of the treatment plan had to include he is doing chemical dependency treatment.  Patient reports that he has no side effects from Depakote and Haldol feels that his thinking is much clearer and feels much less irritable and agitated     Hospital Course        Medications     Current Facility-Administered Medications Ordered in Epic   Medication Dose Route Frequency Last Rate Last Dose     acetaminophen (TYLENOL) tablet 650 mg  650 mg Oral Q4H PRN         ARIPiprazole (ABILIFY) tablet 10 mg  10 mg Oral Daily   10 mg at 12/31/19 0756     clonazePAM (klonoPIN) tablet 0.5 mg  0.5 mg Oral BID PRN   0.5 mg at 12/27/19 0019     diphenhydrAMINE (BENADRYL) injection 50 mg  50 mg Intramuscular Q6H PRN         divalproex sodium extended-release (DEPAKOTE ER) 24 hr tablet 1,000 mg  1,000 mg Oral BID   1,000 mg at 12/31/19 0756     haloperidol (HALDOL) tablet 10 mg  10 mg Oral TID   10 mg at 12/31/19 0755     haloperidol (HALDOL) tablet 5-10 mg  5-10 mg Oral Q4H PRN   10 mg at 12/30/19 1513     hydrOXYzine (ATARAX) tablet 25 mg  25 mg Oral Q4H PRN   25 mg at 12/26/19 2304     nicotine (NICORETTE) gum 2-4 mg  2-4 mg Buccal Q1H PRN   4 mg at 12/29/19 1536     OLANZapine zydis (zyPREXA) ODT tab 5-10 mg  5-10 mg Oral Q4H PRN   10 mg at 12/27/19 1151    Or     OLANZapine (zyPREXA) injection 5-10 mg  5-10 mg Intramuscular Q4H PRN         Current Outpatient Medications Ordered in Epic   Medication     [START ON 1/1/2020] ARIPiprazole (ABILIFY) 10 MG tablet     divalproex sodium extended-release (DEPAKOTE ER) 500 MG 24 hr tablet     haloperidol (HALDOL) 10 MG tablet         Allergies " "     No Known Allergies     Medical Review of Systems     /73   Pulse 59   Temp 97.6  F (36.4  C) (Oral)   Resp 16   Ht 1.854 m (6' 1\")   Wt 85.8 kg (189 lb 3.2 oz)   SpO2 97%   BMI 24.96 kg/m    Body mass index is 24.96 kg/m .  A 10-point review of systems was performed by Segundo Garduno MD and is negative, no new findings.      Psychiatric Examination     Appearance Sitting in chair, dressed in hospital scrubs. Appears stated age.   Attitude Cooperative   Orientation Oriented to person, place, time   Eye Contact Poor   Speech Regular rate, rhythm, volume and tone   Language Normal   Psychomotor Behavior Normal   Mood  labile reaction.   Affect  aggravated irritable   Thought Process Goal-Oriented, Intact   Associations  much less but still present   Thought Content  positive for grandiosity paranoia racing thoughts in   Fund of Knowledge  intact   Insight  Insight impaired but some improvement   Judgement  judgment a little less impaired   Attention Span & Concentration  poor   Recent & Remote Memory  intact   Gait Normal   Muscle Tone Intact        Labs     Labs reviewed.  Recent Results (from the past 24 hour(s))   Valproic acid    Collection Time: 12/31/19  8:33 AM   Result Value Ref Range    Valproic Acid Level 112 (H) 50 - 100 mg/L        Impression     This is a 44 year old male with history of bipolar affective disorder currently manic with psychotic features extremely labile he is received multiple milligrams of medicines we will increase his Depakote to 1000 twice a day Depakote XR gave verbal consent we will recheck a blood level tomorrow again after having a 54 this morning on 1500 mg we will continue Zyprexa and also as needed's of Haldol initial attempts patient signed voluntarily and is now refusing was placed back in a 72-hour hold patient also had some better insight into need for greater level of compressive treatment care including mental illness        Diagnoses     Bipolar " Disorder, most recent episode manic with psychotic features   Alcohol use disorder  Cannabis use disorder     Plan     1. Explained side effects, benefits, and complications of medications to the patient, Pt gave verbal consent.  Alcohol use disorder  2. Medication changes: Increase Depakote to 2000 and ER extended release  a day, continue Zyprexa scheduled and as needed was also on [id number] tid  3. Discussed treatment pierre wiith patient and team.  4. Projected length of stay: 4 days  5. Once patient is stable we will redo competency assessment  6. Continue to contact patient's spouse      Attestation:   Patient has been seen and evaluated by me, Segundo Garduno MD.    Patient ID:[id number]  Name: Nando Garcia    MRN: [id number]  Admission: 12/24/2019   YOB: 1975